# Patient Record
Sex: FEMALE | Race: WHITE | Employment: FULL TIME | URBAN - NONMETROPOLITAN AREA
[De-identification: names, ages, dates, MRNs, and addresses within clinical notes are randomized per-mention and may not be internally consistent; named-entity substitution may affect disease eponyms.]

---

## 2022-02-27 ENCOUNTER — HOSPITAL ENCOUNTER (EMERGENCY)
Age: 32
Discharge: HOME | End: 2022-02-27
Payer: COMMERCIAL

## 2022-02-27 VITALS
TEMPERATURE: 98.3 F | OXYGEN SATURATION: 98 % | SYSTOLIC BLOOD PRESSURE: 125 MMHG | RESPIRATION RATE: 16 BRPM | DIASTOLIC BLOOD PRESSURE: 68 MMHG | HEART RATE: 78 BPM

## 2022-02-27 DIAGNOSIS — N30.01 ACUTE CYSTITIS WITH HEMATURIA: Primary | ICD-10-CM

## 2022-02-27 LAB
BILIRUBIN URINE: NEGATIVE
BLOOD, URINE: ABNORMAL
CHARACTER, URINE: CLEAR
COLOR: YELLOW
GLUCOSE URINE: NEGATIVE MG/DL
KETONES, URINE: NEGATIVE
LEUKOCYTE ESTERASE, URINE: ABNORMAL
NITRITE, URINE: NEGATIVE
PH UA: 7 (ref 5–9)
PREGNANCY, URINE: NEGATIVE
PROTEIN UA: 30 MG/DL
SPECIFIC GRAVITY UA: 1.02 (ref 1–1.03)
UROBILINOGEN, URINE: 0.2 EU/DL (ref 0.2–1)

## 2022-02-27 PROCEDURE — 84703 CHORIONIC GONADOTROPIN ASSAY: CPT

## 2022-02-27 PROCEDURE — 81003 URINALYSIS AUTO W/O SCOPE: CPT

## 2022-02-27 PROCEDURE — 99203 OFFICE O/P NEW LOW 30 MIN: CPT | Performed by: NURSE PRACTITIONER

## 2022-02-27 PROCEDURE — 99203 OFFICE O/P NEW LOW 30 MIN: CPT

## 2022-02-27 PROCEDURE — 87186 SC STD MICRODIL/AGAR DIL: CPT

## 2022-02-27 PROCEDURE — 87077 CULTURE AEROBIC IDENTIFY: CPT

## 2022-02-27 PROCEDURE — 87086 URINE CULTURE/COLONY COUNT: CPT

## 2022-02-27 RX ORDER — PHENAZOPYRIDINE HYDROCHLORIDE 200 MG/1
200 TABLET, FILM COATED ORAL 3 TIMES DAILY PRN
Qty: 9 TABLET | Refills: 0 | Status: SHIPPED | OUTPATIENT
Start: 2022-02-27 | End: 2022-03-02

## 2022-02-27 RX ORDER — NITROFURANTOIN 25; 75 MG/1; MG/1
100 CAPSULE ORAL 2 TIMES DAILY
Qty: 14 CAPSULE | Refills: 0 | Status: SHIPPED | OUTPATIENT
Start: 2022-02-27 | End: 2022-03-06

## 2022-02-27 ASSESSMENT — ENCOUNTER SYMPTOMS
RESPIRATORY NEGATIVE: 1
NAUSEA: 1
EYES NEGATIVE: 1

## 2022-02-27 NOTE — ED TRIAGE NOTES
Dominick Staley arrives to room with complaint of dysuria symptoms started 1 days ago. Dominick Staley states she had back pain yesterday but none today.

## 2022-02-27 NOTE — ED PROVIDER NOTES
[address]       Chief Complaint   Patient presents with    Dysuria       Nurses Notes reviewed and I agree except as noted in the HPI. HISTORY OF PRESENT ILLNESS   Elida Ramirez is a 36 y.o. female who presents The history is provided by the patient. Dysuria   This is a new problem. The current episode started more than 2 days ago. The problem occurs every urination. The problem has not changed since onset. The quality of the pain is described as burning and aching. The pain is at a severity of 5/10. The pain is moderate. There has been no fever. The fever has been present for less than 1 day. She is sexually active. There is no history of pyelonephritis. Associated symptoms include chills, sweats, nausea, frequency, urgency and flank pain. She has tried increased fluids and NSAIDs for the symptoms. Her past medical history is significant for recurrent UTIs. Her past medical history does not include urinary stasis. REVIEW OF SYSTEMS     Review of Systems   Constitutional: Positive for activity change, appetite change and chills. HENT: Negative. Eyes: Negative. Respiratory: Negative. Cardiovascular: Negative for chest pain and leg swelling. Gastrointestinal: Positive for nausea. Endocrine: Negative for cold intolerance and heat intolerance. Genitourinary: Positive for decreased urine volume, difficulty urinating, dysuria, flank pain, frequency and urgency. Negative for menstrual problem and pelvic pain. Skin: Negative. Neurological: Negative. Hematological: Negative for adenopathy. Does not bruise/bleed easily. Psychiatric/Behavioral: Negative.         PAST MEDICAL HISTORY         Diagnosis Date    Asthma 7th grade    sports induced asthma, none now    Bilateral ovarian cysts     Chlamydia     Hyperprolactinemia ([facility] [address]     Postpartum depression        SURGICAL HISTORY     Patient  has no past surgical Cervical: No cervical adenopathy. Skin:     General: Skin is warm and dry. Capillary Refill: Capillary refill takes less than 2 seconds. Coloration: Skin is not pale. Findings: No erythema or rash. Neurological:      General: No focal deficit present. Mental Status: She is alert and oriented to person, place, and time. Mental status is at baseline. Cranial Nerves: No cranial nerve deficit. Motor: No abnormal muscle tone. Coordination: Coordination normal.      Deep Tendon Reflexes: Reflexes are normal and symmetric. Reflexes normal.   Psychiatric:         Behavior: Behavior normal.         Thought Content: Thought content normal.         Judgment: Judgment normal.         DIAGNOSTIC RESULTS   Labs:   Results for orders placed or performed during the hospital encounter of 02/27/22   Urinalysis   Result Value Ref Range    Glucose, Ur Negative NEGATIVE mg/dl    Bilirubin Urine Negative NEGATIVE    Ketones, Urine Negative NEGATIVE    Specific Gravity, UA 1.025 1.002 - 1.030    Blood, Urine Moderate (A) NEGATIVE    pH, UA 7.00 5.0 - 9.0    Protein, UA 30 (A) NEGATIVE mg/dl    Urobilinogen, Urine 0.20 0.2 - 1.0 eu/dl    Nitrite, Urine Negative NEGATIVE    Leukocyte Esterase, Urine Large (A) NEGATIVE    Color, UA Yellow STRAW-YELLOW    Character, Urine Clear CLEAR-SL CLOUD   Pregnancy, Urine   Result Value Ref Range    Pregnancy, Urine NEGATIVE NEGATIVE       IMAGING:  No orders to display     URGENT CARE COURSE:     Vitals:    02/27/22 1031   BP: 125/68   Pulse: 78   Resp: 16   Temp: 98.3 °F (36.8 °C)   TempSrc: Temporal   SpO2: 98%       Medications - No data to display  PROCEDURES:  None  FINALIMPRESSION    I have reviewed the patient's medical history in detail and updated the computerized patient record. HPI/ROS per the patient and caregiver. Overall non toxic in appearance. Answers questions appropriately.    Conditions discussed and addressed this visit include:     Pt with acute uti symptoms x 3 days. No fever. Tolerating fluids. UA results reviewed with the patient. Will have her start macrobid and pyridium. Encouraged pure cranberry juice and water for her fluids. Follow up in Urgent care if symptoms do not improve in the next 48 hours. Urine sent for culture. Pt agreeable to plan of care and instruction provided.    1. Acute cystitis with hematuria        DISPOSITION/PLAN   DISPOSITION      PATIENT REFERRED TO:  [address] [facility]  [id number] [facility]  [phone]  In 3 days  As needed, If symptoms worsen    DISCHARGE MEDICATIONS:  New Prescriptions    NITROFURANTOIN, MACROCRYSTAL-MONOHYDRATE, (MACROBID) 100 MG CAPSULE    Take 1 capsule by mouth 2 times daily for 7 days    PHENAZOPYRIDINE (PYRIDIUM) 200 MG TABLET    Take 1 tablet by mouth 3 times daily as needed for Pain     Current Discharge Medication List          KEVIN Hunter CNP, APRN - CNP  02/27/22 5829

## 2022-03-02 LAB
ORGANISM: ABNORMAL
URINE CULTURE, ROUTINE: ABNORMAL

## 2024-04-09 ENCOUNTER — HOSPITAL ENCOUNTER (EMERGENCY)
Age: 34
Discharge: HOME | End: 2024-04-09
Payer: COMMERCIAL

## 2024-04-09 VITALS
SYSTOLIC BLOOD PRESSURE: 136 MMHG | HEART RATE: 90 BPM | TEMPERATURE: 98.5 F | RESPIRATION RATE: 16 BRPM | OXYGEN SATURATION: 98 % | DIASTOLIC BLOOD PRESSURE: 88 MMHG | WEIGHT: 210 LBS | BODY MASS INDEX: 31.83 KG/M2 | HEIGHT: 68 IN

## 2024-04-09 DIAGNOSIS — M54.16 LUMBAR RADICULAR PAIN: ICD-10-CM

## 2024-04-09 DIAGNOSIS — S39.012A STRAIN OF LUMBAR REGION, INITIAL ENCOUNTER: Primary | ICD-10-CM

## 2024-04-09 PROCEDURE — 99283 EMERGENCY DEPT VISIT LOW MDM: CPT

## 2024-04-09 PROCEDURE — 6370000000 HC RX 637 (ALT 250 FOR IP): Performed by: NURSE PRACTITIONER

## 2024-04-09 RX ORDER — LIDOCAINE 4 G/G
1 PATCH TOPICAL DAILY
Qty: 30 PATCH | Refills: 0 | Status: SHIPPED | OUTPATIENT
Start: 2024-04-09 | End: 2024-05-09

## 2024-04-09 RX ORDER — PREDNISONE 20 MG/1
TABLET ORAL
Qty: 15 TABLET | Refills: 0 | Status: SHIPPED | OUTPATIENT
Start: 2024-04-09 | End: 2024-04-19

## 2024-04-09 RX ORDER — PREDNISONE 20 MG/1
60 TABLET ORAL ONCE
Status: COMPLETED | OUTPATIENT
Start: 2024-04-09 | End: 2024-04-09

## 2024-04-09 RX ORDER — TIZANIDINE 4 MG/1
4 TABLET ORAL EVERY 6 HOURS PRN
Qty: 20 TABLET | Refills: 0 | Status: SHIPPED | OUTPATIENT
Start: 2024-04-09

## 2024-04-09 RX ORDER — TIZANIDINE 4 MG/1
4 TABLET ORAL ONCE
Status: COMPLETED | OUTPATIENT
Start: 2024-04-09 | End: 2024-04-09

## 2024-04-09 RX ADMIN — PREDNISONE 60 MG: 20 TABLET ORAL at 10:42

## 2024-04-09 RX ADMIN — TIZANIDINE 4 MG: 4 TABLET ORAL at 10:42

## 2024-04-09 ASSESSMENT — PAIN SCALES - GENERAL: PAINLEVEL_OUTOF10: 7

## 2024-04-09 ASSESSMENT — PAIN - FUNCTIONAL ASSESSMENT: PAIN_FUNCTIONAL_ASSESSMENT: 0-10

## 2024-04-09 NOTE — ED TRIAGE NOTES
Pt to ED with lower back pain. Pt states she was moving furniture, felt a pain and has been in pain ever since. Pt states that the pain radiates to her hips. Does not radiate down the legs. Pt tried a muscle relaxer last night with no relief. Pt tried 1000mg tylenol and has a lidocaine patch on right now.

## 2024-04-09 NOTE — ED PROVIDER NOTES
[facility] Emergency Department    CHIEF COMPLAINT       Chief Complaint   Patient presents with    Back Pain       Nurses Notes reviewed and I agree except as noted in the HPI.    HISTORY OF PRESENT ILLNESS   Ptaricia Bradley is a 34 y.o. female who presents to the ED for evaluation of lower back pain. Patient reports moving furniture yesterday when she started to experience lower back pain. She describes the pain as pressure all the way across her back with radiation to lateral upper thigh bilaterally. She states walking, twisting and bending movements makes it worse. She took one flexeril last night from her dad as well as ibuprofen and lidocaine patch with some relief. She denies any numbness, tingling, weakness, bowel/bladder  incontinence, erythema fever, chest pain, SOB or any other associated symptoms.        HPI was provided by the patient.      PAST MEDICAL HISTORY     Past Medical History:   Diagnosis Date    Asthma 7th grade    sports induced asthma, none now    Bilateral ovarian cysts     Chlamydia     Hyperprolactinemia ([facility])     Postpartum depression        SURGICALHISTORY      has no past surgical history on file.    CURRENT MEDICATIONS       Discharge Medication List as of 4/9/2024 10:53 AM          ALLERGIES     is allergic to bromocriptine.    FAMILY HISTORY     She indicated that her mother is alive. She indicated that her father is alive. She indicated that both of her sisters are alive. She indicated that her brother is alive.   family history includes Depression in her mother; Diabetes in her mother; Heart Disease in her brother and mother; Kidney Disease in her mother; Vision Loss in her mother.    SOCIAL HISTORY       Social History     Socioeconomic History    Marital status:      Spouse name: Not on file    Number of children: Not on file    Years of education: Not on file    Highest education level: Not on file   Occupational History    Not on file   Tobacco Use    Smoking